# Patient Record
Sex: MALE | Race: BLACK OR AFRICAN AMERICAN | Employment: FULL TIME | URBAN - METROPOLITAN AREA
[De-identification: names, ages, dates, MRNs, and addresses within clinical notes are randomized per-mention and may not be internally consistent; named-entity substitution may affect disease eponyms.]

---

## 2017-08-06 ENCOUNTER — HOSPITAL ENCOUNTER (EMERGENCY)
Age: 22
Discharge: HOME | End: 2017-08-06
Attending: EMERGENCY MEDICINE
Payer: OTHER GOVERNMENT

## 2017-08-06 VITALS
WEIGHT: 150 LBS | TEMPERATURE: 98.2 F | OXYGEN SATURATION: 100 % | DIASTOLIC BLOOD PRESSURE: 98 MMHG | BODY MASS INDEX: 20.32 KG/M2 | HEART RATE: 98 BPM | RESPIRATION RATE: 15 BRPM | HEIGHT: 72 IN | SYSTOLIC BLOOD PRESSURE: 148 MMHG

## 2017-08-06 DIAGNOSIS — R03.0 ELEVATED BLOOD PRESSURE READING: ICD-10-CM

## 2017-08-06 DIAGNOSIS — Z71.1 CONCERN ABOUT STD IN MALE WITHOUT DIAGNOSIS: Primary | ICD-10-CM

## 2017-08-06 PROCEDURE — 99282 EMERGENCY DEPT VISIT SF MDM: CPT

## 2017-08-06 NOTE — ED PROVIDER NOTES
HPI Comments: George Ann is a 24 y.o. male that presents to the ED with a complaint of 'unprotected sex with a person of unknown HIV status'. PT states that the encounter was <12 hours ago and cannot get in touch with the person and is interested in receiving PeP or PReP prophylaxis. He understands that treatment needs start with in 72 hours so he presented here. NO other complaints at this time. Patient is a 24 y.o. male presenting with other event. Other   Pertinent negatives include no chest pain and no shortness of breath. History reviewed. No pertinent past medical history. History reviewed. No pertinent surgical history. History reviewed. No pertinent family history. Social History     Social History    Marital status: SINGLE     Spouse name: N/A    Number of children: N/A    Years of education: N/A     Occupational History    Not on file. Social History Main Topics    Smoking status: Never Smoker    Smokeless tobacco: Never Used    Alcohol use No    Drug use: No    Sexual activity: Not on file     Other Topics Concern    Not on file     Social History Narrative         ALLERGIES: Review of patient's allergies indicates no known allergies. Review of Systems   Constitutional: Negative for fatigue and fever. Respiratory: Negative for cough and shortness of breath. Cardiovascular: Negative for chest pain. Gastrointestinal: Negative for diarrhea, nausea and vomiting. All other systems reviewed and are negative. Vitals:    08/06/17 1015   BP: (!) 148/98   Pulse: 98   Resp: 15   Temp: 98.2 °F (36.8 °C)   SpO2: 100%   Weight: 68 kg (150 lb)   Height: 6' (1.829 m)            Physical Exam   Constitutional: He is oriented to person, place, and time. He appears well-developed and well-nourished. No distress. HENT:   Head: Normocephalic and atraumatic. Eyes: Pupils are equal, round, and reactive to light.    Cardiovascular: Normal rate and normal heart sounds. Pulmonary/Chest: Effort normal and breath sounds normal. No respiratory distress. Neurological: He is alert and oriented to person, place, and time. Skin: Skin is warm and dry. Psychiatric: He has a normal mood and affect. His behavior is normal.   Vitals reviewed. MDM  Number of Diagnoses or Management Options  Diagnosis management comments: IMpression: concern for STD male    Plan: discharge home  Follow up with [facility] tomorrow morning    ED Course       Procedures           Vitals:  Patient Vitals for the past 12 hrs:   Temp Pulse Resp BP SpO2   08/06/17 1015 98.2 °F (36.8 °C) 98 15 (!) 148/98 100 %         Medications ordered:   Medications - No data to display      Lab findings:  No results found for this or any previous visit (from the past 12 hour(s)). X-Ray, CT or other radiology findings or impressions:  No orders to display       Progress notes, Consult notes or additional Procedure notes:       Disposition:  Diagnosis: No diagnosis found. Disposition: discharge    Follow-up Information     None           Patient's Medications   Start Taking    No medications on file   Continue Taking    No medications on file   These Medications have changed    No medications on file   Stop Taking    PROMETHAZINE (PHENERGAN) 25 MG TABLET    Take 1 Tab by mouth every six (6) hours as needed for Nausea. Caution: This medication may make you drowsy. Avoid driving while under the influence of this medication.